# Patient Record
(demographics unavailable — no encounter records)

---

## 2024-12-27 NOTE — PHYSICAL EXAM
[No Acute Distress] : no acute distress [Normal Sclera/Conjunctiva] : normal sclera/conjunctiva [PERRL] : pupils equal round and reactive to light [Normal Outer Ear/Nose] : the outer ears and nose were normal in appearance [Normal Oropharynx] : the oropharynx was normal [No JVD] : no jugular venous distention [No Lymphadenopathy] : no lymphadenopathy [No Respiratory Distress] : no respiratory distress  [No Accessory Muscle Use] : no accessory muscle use [Clear to Auscultation] : lungs were clear to auscultation bilaterally [Normal Rate] : normal rate  [Regular Rhythm] : with a regular rhythm [No Carotid Bruits] : no carotid bruits [No Edema] : there was no peripheral edema [No Extremity Clubbing/Cyanosis] : no extremity clubbing/cyanosis [Declined Breast Exam] : declined breast exam  [Soft] : abdomen soft [Non Tender] : non-tender [Normal Bowel Sounds] : normal bowel sounds [Declined Rectal Exam] : declined rectal exam [Normal Anterior Cervical Nodes] : no anterior cervical lymphadenopathy [No Spinal Tenderness] : no spinal tenderness [Grossly Normal Strength/Tone] : grossly normal strength/tone [No Rash] : no rash [No Focal Deficits] : no focal deficits [Normal Insight/Judgement] : insight and judgment were intact

## 2024-12-27 NOTE — HEALTH RISK ASSESSMENT
[Yes] : Yes [2 - 4 times a month (2 pts)] : 2-4 times a month (2 points) [1 or 2 (0 pts)] : 1 or 2 (0 points) [Never (0 pts)] : Never (0 points) [No] : In the past 12 months have you used drugs other than those required for medical reasons? No [No falls in past year] : Patient reported no falls in the past year [0] : 2) Feeling down, depressed, or hopeless: Not at all (0) [PHQ-2 Negative - No further assessment needed] : PHQ-2 Negative - No further assessment needed [Former] : Former [20 or more] : 20 or more [None] : None [With Family] : lives with family [] :  [Feels Safe at Home] : Feels safe at home [Fully functional (bathing, dressing, toileting, transferring, walking, feeding)] : Fully functional (bathing, dressing, toileting, transferring, walking, feeding) [Fully functional (using the telephone, shopping, preparing meals, housekeeping, doing laundry, using] : Fully functional and needs no help or supervision to perform IADLs (using the telephone, shopping, preparing meals, housekeeping, doing laundry, using transportation, managing medications and managing finances) [Reports normal functional visual acuity (ie: able to read med bottle)] : Reports normal functional visual acuity [With Patient/Caregiver] : , with patient/caregiver [Designated Healthcare Proxy] : Designated healthcare proxy [Name: ___] : Health Care Proxy's Name: [unfilled]  [Relationship: ___] : Relationship: [unfilled] [Audit-CScore] : 2 [[id number]] : 0 [de-identified] : quit 40 years ago [Change in mental status noted] : No change in mental status noted [Language] : denies difficulty with language [Behavior] : denies difficulty with behavior [Learning/Retaining New Information] : denies difficulty learning/retaining new information [Handling Complex Tasks] : denies difficulty handling complex tasks [Reasoning] : denies difficulty with reasoning [Spatial Ability and Orientation] : denies difficulty with spatial ability and orientation [Reports changes in hearing] : Reports no changes in hearing [Reports changes in vision] : Reports no changes in vision [Reports changes in dental health] : Reports no changes in dental health [AdvancecareDate] : 12/24 [FreeTextEntry4] : Pt has proxy at  home

## 2024-12-27 NOTE — HISTORY OF PRESENT ILLNESS
[de-identified] : Pt comes for FBW and EKG and med renewal and exam and prevnar 20 and annual medicare well visit.  Pt sees Dr Dupont derm and Dr Han ortho and Dr Quinteros eyes and Dr Sanders retina and Dr Johnston colonoscopy 2020 for positive cologuard and Dr Antoine cardio.  Pt states that chin lesion has not healed in 6 months  Pt does not want to go for mammo anymore

## 2024-12-27 NOTE — REVIEW OF SYSTEMS
[Joint Pain] : joint pain [Mole Changes] : mole changes [Hair Changes] : hair changes [Fever] : no fever [Chills] : no chills [Discharge] : no discharge [Vision Problems] : no vision problems [Earache] : no earache [Sore Throat] : no sore throat [Chest Pain] : no chest pain [Palpitations] : no palpitations [Lower Ext Edema] : no lower extremity edema [Shortness Of Breath] : no shortness of breath [Wheezing] : no wheezing [Cough] : no cough [Abdominal Pain] : no abdominal pain [Dysuria] : no dysuria [Dizziness] : no dizziness [Unsteady Walking] : no ataxia [Depression] : no depression [Swollen Glands] : no swollen glands

## 2025-02-25 NOTE — REVIEW OF SYSTEMS
[Chills] : chills [Fatigue] : fatigue [Earache] : earache [Nasal Discharge] : nasal discharge [Sore Throat] : sore throat [Postnasal Drip] : postnasal drip [Cough] : cough [Dyspnea on Exertion] : dyspnea on exertion [Negative] : Heme/Lymph [Fever] : no fever [Hot Flashes] : no hot flashes [Night Sweats] : no night sweats [Recent Change In Weight] : ~T no recent weight change [Hearing Loss] : no hearing loss [Nosebleed] : no nosebleeds [Hoarseness] : no hoarseness [Shortness Of Breath] : no shortness of breath [Wheezing] : no wheezing

## 2025-02-25 NOTE — PHYSICAL EXAM

## 2025-02-25 NOTE — HISTORY OF PRESENT ILLNESS
[FreeTextEntry8] : Pt comes in saying "I have a bad cold". Said Sunday started having sore throat, postnasal drip. Then started coughing, occasionally clear, now yellow and more productive. Has fatigue and chills but no fevers. Her ears feel "scratchy". Took Flonase once, helped a little. Took at home flu/covid rapid test which was negative. Felt out of breath when walking up the stairs with her dog yesterday but does not feel SOB at rest. No abd pain, nausea, vomiting, diarrhea, PND/orthopnea, chest pain, palpitations.

## 2025-04-22 NOTE — REVIEW OF SYSTEMS
[Joint Pain] : joint pain [Joint Stiffness] : joint stiffness [Back Pain] : back pain [Fever] : no fever [Chills] : no chills [Chest Pain] : no chest pain [Palpitations] : no palpitations [Lower Ext Edema] : no lower extremity edema [Shortness Of Breath] : no shortness of breath [Wheezing] : no wheezing [Abdominal Pain] : no abdominal pain

## 2025-04-22 NOTE — HISTORY OF PRESENT ILLNESS
[de-identified] : Pt comes for FBW and med renewal and for left sided sciatica  Has not gone for any PT nor taken any meds. Pt has hac episodes in past and had ct scan spine 2 yrs ago because afraid to go for mri

## 2025-05-06 NOTE — ASSESSMENT
[FreeTextEntry1] : Subjective findings This 79-year-old female presents with pain in the back with a radiating component down her legs.  Patient says that she has had this intermittently over the course of the last couple of years.  Her last attack was approximately a year ago and was treated with increased activity and resolution of the symptoms within about 5 or 6 weeks.  This most recent attack started approximately 2 months ago and has not abated at all.  The patient denies any bowel or bladder incontinence any progressive weakness states that this pain is very life limiting.  She is resorted to the use of a cane secondary to the discomfort.  She has been tried with oral steroids with minimal success in the treatment of the pain.  The CV/Pulm/GI/Heme/Renal/Hepatic//Psych/Endo systems are reviewed. A full ROS was performed and reviewed with the patient today, see intake form.  Average pain score for the month is 7 out of ten. The patient's current medications are documented to the best of their ability. The patient has failed conservative therapies to include medical management, and physical activity to treat the pain. The patient has decreased function secondary to the pain. Objective findings There are no recent imaging studies of the lumbar spine.  Patient is able to get to a standing position without difficulty.  Patient ambulates with a cane for stability.  Motor and sensory exams appear grossly intact. Assessment Lumbar radiculopathy Plan Prior to any intervention the patient will obtain an imaging study of the lumbar spine to try to elucidate the exact etiology of the pain.  Spoke to patient about epidural steroid injections. Explained risks, benefits and alternatives including but not limited to the risk of infection, bleeding, headache, syncopal episode, failure to resolve issues, allergic reaction, symptom recurrence, allergic reaction, nerve injury, and increased pain. The patient understands the risks. All questions were answered. The patient is willing to proceed. The importance of increasing exercise after the injection is also stressed. The use of the injection as a jump start so that the patient can do more exercise, but that the exercise is the long-term answer for the patient is reviewed. The patient states understanding.  This note was generated by using Dragon medical dictation software.  A reasonable effort has been made for proofreading its contents, but typos may still remain.  If there are any questions or points of clarification needed, please notify my office.

## 2025-05-06 NOTE — REVIEW OF SYSTEMS
[Hoarseness] : hoarseness [Nasal Discharge] : nasal discharge [Postnasal Drip] : postnasal drip [Negative] : Respiratory [Sore Throat] : no sore throat

## 2025-05-06 NOTE — HISTORY OF PRESENT ILLNESS
[FreeTextEntry1] : THIS PLEASANT PATIENT IS 79 year OLD  female  WHO PRESENT TODAY IN OFFICE WITH LSCLEMENCIA [place] DWN THE LT THIGH AND GROIN  DATE OF INJURY/ONSET       PAIN LEVEL: 5/10     MECHANISM OF INJURY: UNKOWN INJURAY           QUALITY OF SYMPTOMS:  RADATING, SHOOTING, STABBING     IMPROVES WITH:  TRIED ICE, HEAT      WORSE WITH:  STANDING, WALKING,      PRIOR TREATMENT:  AS OF 03/15/2025 PATIENT IS PRESENTING WITH ACUTE/SUB-ACUTE RADICULAR PAIN WITH IMPAIRMENT IN ADLs AND FUNCTIONALITY. THE PATIENT HAS NOT RESPONDED TO CONSERVATIVE CARE INCLUDING NSAID THERAPY AND/OR PHYSICAL THERAPY 2-3X A WEEK FOR 6 WEEK.           PRIOR IMAGING:  CT 06.03.2023       SCHOOL/SPORT/POSITION/OCCUPATION:       ADDITIONAL INFORMATION

## 2025-07-18 NOTE — HISTORY OF PRESENT ILLNESS
[Home] : at home, [unfilled] , at the time of the visit. [Medical Office: ([facility])___] : at the medical office located in  [Telehealth (audio & video)] : This visit was provided via telehealth using real-time 2-way audio visual technology. [Verbal consent obtained from patient] : the patient, [unfilled] [FreeTextEntry1] : med renewal, right knee pain [de-identified] : Pt requests TEB for med renewal of Tramadol. She twisted her right knee getting out of shower on 7/16. Went to [place] [facility], xray no fracture told to f/u with ortho Dr. Han. Given 3 day supply of tramadol which helped. Saw ortho today and given gel shot and Meloxicam but not helping.

## 2025-07-18 NOTE — ASSESSMENT
[FreeTextEntry1] : ISTOP reviewed and confirmed by me; no evidence of abuse/diversion. Will write 10 days supply of Tramadol 25mg q6h prn for severe pain. Narcan sent with it. If needs more than this needs to see pain specialist. F/u with ortho as scheduled.